# Patient Record
Sex: MALE | Race: WHITE | NOT HISPANIC OR LATINO | Employment: FULL TIME | URBAN - METROPOLITAN AREA
[De-identification: names, ages, dates, MRNs, and addresses within clinical notes are randomized per-mention and may not be internally consistent; named-entity substitution may affect disease eponyms.]

---

## 2024-10-25 ENCOUNTER — OFFICE VISIT (OUTPATIENT)
Dept: UROLOGY | Facility: CLINIC | Age: 69
End: 2024-10-25
Payer: MEDICARE

## 2024-10-25 VITALS
SYSTOLIC BLOOD PRESSURE: 128 MMHG | HEART RATE: 60 BPM | WEIGHT: 234.8 LBS | BODY MASS INDEX: 31.12 KG/M2 | HEIGHT: 73 IN | DIASTOLIC BLOOD PRESSURE: 78 MMHG

## 2024-10-25 DIAGNOSIS — N40.1 BENIGN PROSTATIC HYPERPLASIA WITH LOWER URINARY TRACT SYMPTOMS, SYMPTOM DETAILS UNSPECIFIED: Primary | ICD-10-CM

## 2024-10-25 LAB
BILIRUB BLD-MCNC: NEGATIVE MG/DL
CLARITY, POC: CLEAR
COLOR UR: YELLOW
EXPIRATION DATE: ABNORMAL
GLUCOSE UR STRIP-MCNC: ABNORMAL MG/DL
KETONES UR QL: NEGATIVE
LEUKOCYTE EST, POC: NEGATIVE
Lab: ABNORMAL
NITRITE UR-MCNC: NEGATIVE MG/ML
PH UR: 5.5 [PH] (ref 5–8)
PROT UR STRIP-MCNC: NEGATIVE MG/DL
RBC # UR STRIP: NEGATIVE /UL
SP GR UR: 1.01 (ref 1–1.03)
URINE VOLUME: 470
UROBILINOGEN UR QL: ABNORMAL

## 2024-10-25 RX ORDER — ROSUVASTATIN CALCIUM 5 MG/1
5 TABLET, COATED ORAL
COMMUNITY
Start: 2024-09-10

## 2024-10-25 RX ORDER — TADALAFIL 5 MG/1
5 TABLET ORAL DAILY
Qty: 90 TABLET | Refills: 3 | Status: SHIPPED | OUTPATIENT
Start: 2024-10-25

## 2024-10-25 RX ORDER — LISINOPRIL 2.5 MG/1
1 TABLET ORAL DAILY
COMMUNITY
Start: 2024-09-10

## 2024-10-25 NOTE — PROGRESS NOTES
"Chief Complaint: Benign Prostatic Hypertrophy    Subjective         History of Present Illness  Keven Berger is a 69 y.o. male presents to [facility] to be seen for BPH.    He states a 3 year hx of issues with voiding that have gradually worsened.    He states intermittent stream.     Hesitancy.     Straining to void.    He states he can have urgency as well.     Nocturia x 1-2    Stream is mostly weak.    Some splitting of urinary stream.    He does have an issue keeping erections.     He is still sexually active.     PCP doing PSA testing states this was normal but I do not have any records form PCP.     Did have a brother with prostate cancer.        Objective     Past Medical History:   Diagnosis Date    Diabetes mellitus        History reviewed. No pertinent surgical history.      Current Outpatient Medications:     lisinopril (PRINIVIL,ZESTRIL) 2.5 MG tablet, Take 1 tablet by mouth Daily., Disp: , Rfl:     metFORMIN (GLUCOPHAGE) 500 MG tablet, Take 1 tablet by mouth 2 (Two) Times a Day With Meals., Disp: , Rfl:     rosuvastatin (CRESTOR) 5 MG tablet, Take 1 tablet by mouth every night at bedtime., Disp: , Rfl:     tadalafil (CIALIS) 5 MG tablet, Take 1 tablet by mouth Daily., Disp: 90 tablet, Rfl: 3    No Known Allergies     History reviewed. No pertinent family history.    Social History     Socioeconomic History    Marital status:    Tobacco Use    Smoking status: Never     Passive exposure: Never    Smokeless tobacco: Never   Vaping Use    Vaping status: Never Used       Vital Signs:   /78 (BP Location: Left arm, Patient Position: Sitting, Cuff Size: Large Adult)   Pulse 60   Ht 185.4 cm (73\")   Wt 107 kg (234 lb 12.8 oz)   BMI 30.98 kg/m²      Physical Exam  Genitourinary:           Result Review :   The following data was reviewed by: CARLOS Guardado on 10/25/2024:  Results for orders placed or performed in visit on 10/25/24   POC Urinalysis Dipstick, " Automated    Collection Time: 10/25/24 10:10 AM    Specimen: Urine   Result Value Ref Range    Color Yellow Yellow, Straw, Dark Yellow, Catherine    Clarity, UA Clear Clear    Specific Gravity  1.015 1.005 - 1.030    pH, Urine 5.5 5.0 - 8.0    Leukocytes Negative Negative    Nitrite, UA Negative Negative    Protein, POC Negative Negative mg/dL    Glucose,  mg/dL (A) Negative mg/dL    Ketones, UA Negative Negative    Urobilinogen, UA 0.2 E.U./dL Normal, 0.2 E.U./dL    Bilirubin Negative Negative    Blood, UA Negative Negative    Lot Number [id number]     Expiration Date 7/25    Bladder Scan    Collection Time: 10/25/24 10:46 AM   Result Value Ref Range    Urine Volume 470       Bladder Scan interpretation 10/25/2024    Estimation of residual urine via Digital SignalI 3000 Verathon Bladder Scan  MA/nurse performing: diana Veronica Ma   Residual Urine: 470 ml  Indication: Benign prostatic hyperplasia with lower urinary tract symptoms, symptom details unspecified   Position: Supine  Examination: Incremental scanning of the suprapubic area using 2.0 MHz transducer using copious amounts of acoustic gel.   Findings: An anechoic area was demonstrated which represented the bladder, with measurement of residual urine as noted. I inspected this myself. In that the residual urine was stable or insignificant, refer to plan for treatment and plan necessary at this time.            Procedures        Assessment and Plan    Diagnoses and all orders for this visit:    1. Benign prostatic hyperplasia with lower urinary tract symptoms, symptom details unspecified (Primary)  -     Bladder Scan  -     POC Urinalysis Dipstick, Automated  -     tadalafil (CIALIS) 5 MG tablet; Take 1 tablet by mouth Daily.  Dispense: 90 tablet; Refill: 3          Discussed with the patient at this point in time he is not emptying his bladder well at all.    I did discuss with the patient that his symptoms do sound consistent with BPH with incomplete bladder  emptying.    Given that he is still sexually active and he does not want to have any potential sexual side effects from traditional for blockers prescribed for this we will go ahead and proceed with treating him with tadalafil.    Did encourage patient to double void and timed voiding to ensure he is completely emptying his bladder.    I did discuss with the patient I would like to have catheterized him today to ensure accurate postvoid residual however he did not wish to proceed with this intervention at this time.    We will plan for a follow-up appointment in 8 weeks with a repeat PVR.        I spent 15 minutes caring for Keven on this date of service. This time includes time spent by me in the following activities:reviewing tests, obtaining and/or reviewing a separately obtained history, performing a medically appropriate examination and/or evaluation , counseling and educating the patient/family/caregiver, ordering medications, tests, or procedures, and documenting information in the medical record  Follow Up   Return in about 8 weeks (around 12/17/2024) for 11:30 f/u bph with PVR .  Patient was given instructions and counseling regarding his condition or for health maintenance advice. Please see specific information pulled into the AVS if appropriate.         This document has been electronically signed by CARLOS Guardado  October 25, 2024 10:54 EDT

## 2024-12-17 ENCOUNTER — LAB (OUTPATIENT)
Facility: HOSPITAL | Age: 69
End: 2024-12-17
Payer: MEDICARE

## 2024-12-17 ENCOUNTER — OFFICE VISIT (OUTPATIENT)
Dept: UROLOGY | Age: 69
End: 2024-12-17
Payer: MEDICARE

## 2024-12-17 VITALS
HEIGHT: 73 IN | HEART RATE: 57 BPM | OXYGEN SATURATION: 96 % | WEIGHT: 235 LBS | SYSTOLIC BLOOD PRESSURE: 137 MMHG | DIASTOLIC BLOOD PRESSURE: 84 MMHG | BODY MASS INDEX: 31.14 KG/M2

## 2024-12-17 DIAGNOSIS — R33.9 URINARY RETENTION: ICD-10-CM

## 2024-12-17 DIAGNOSIS — N40.1 BENIGN PROSTATIC HYPERPLASIA WITH LOWER URINARY TRACT SYMPTOMS, SYMPTOM DETAILS UNSPECIFIED: ICD-10-CM

## 2024-12-17 DIAGNOSIS — N40.1 BENIGN PROSTATIC HYPERPLASIA WITH LOWER URINARY TRACT SYMPTOMS, SYMPTOM DETAILS UNSPECIFIED: Primary | ICD-10-CM

## 2024-12-17 LAB
BILIRUB BLD-MCNC: NEGATIVE MG/DL
CLARITY, POC: CLEAR
COLOR UR: YELLOW
GLUCOSE UR STRIP-MCNC: ABNORMAL MG/DL
KETONES UR QL: NEGATIVE
LEUKOCYTE EST, POC: NEGATIVE
NITRITE UR-MCNC: NEGATIVE MG/ML
PH UR: 6 [PH] (ref 5–8)
PROT UR STRIP-MCNC: NEGATIVE MG/DL
PSA SERPL-MCNC: 0.98 NG/ML (ref 0–4)
RBC # UR STRIP: NEGATIVE /UL
SP GR UR: 1.02 (ref 1–1.03)
SPECIMEN VOL SMN: >859 ML
UROBILINOGEN UR QL: ABNORMAL

## 2024-12-17 PROCEDURE — 1159F MED LIST DOCD IN RCRD: CPT | Performed by: NURSE PRACTITIONER

## 2024-12-17 PROCEDURE — 84153 ASSAY OF PSA TOTAL: CPT

## 2024-12-17 PROCEDURE — 1160F RVW MEDS BY RX/DR IN RCRD: CPT | Performed by: NURSE PRACTITIONER

## 2024-12-17 PROCEDURE — 81002 URINALYSIS NONAUTO W/O SCOPE: CPT | Performed by: NURSE PRACTITIONER

## 2024-12-17 PROCEDURE — 99213 OFFICE O/P EST LOW 20 MIN: CPT | Performed by: NURSE PRACTITIONER

## 2024-12-17 PROCEDURE — 51798 US URINE CAPACITY MEASURE: CPT | Performed by: NURSE PRACTITIONER

## 2024-12-17 PROCEDURE — 36415 COLL VENOUS BLD VENIPUNCTURE: CPT

## 2024-12-17 NOTE — PROGRESS NOTES
"Chief Complaint: Benign Prostatic Hypertrophy    Subjective         Benign Prostatic Hypertrophy      Keven Berger is a 69 y.o. male presents to [facility] to be seen for  f/u BPH.    At last visit we started him on tadalafil daily for BPH.     He state he has seen no real change in symptoms.    Still weak flow at times.     Still hard to get started at times.     PVR today is 859        Previous:     He states a 3 year hx of issues with voiding that have gradually worsened.    He states intermittent stream.     Hesitancy.     Straining to void.    He states he can have urgency as well.     Nocturia x 1-2    Stream is mostly weak.    Some splitting of urinary stream.    He does have an issue keeping erections.     He is still sexually active.     PCP doing PSA testing states this was normal but I do not have any records form PCP.     Did have a brother with prostate cancer.        Objective     Past Medical History:   Diagnosis Date    Diabetes mellitus        History reviewed. No pertinent surgical history.      Current Outpatient Medications:     lisinopril (PRINIVIL,ZESTRIL) 2.5 MG tablet, Take 1 tablet by mouth Daily., Disp: , Rfl:     metFORMIN (GLUCOPHAGE) 500 MG tablet, Take 1 tablet by mouth 2 (Two) Times a Day With Meals., Disp: , Rfl:     rosuvastatin (CRESTOR) 5 MG tablet, Take 1 tablet by mouth every night at bedtime., Disp: , Rfl:     tadalafil (CIALIS) 5 MG tablet, Take 1 tablet by mouth Daily., Disp: 90 tablet, Rfl: 3    No Known Allergies     History reviewed. No pertinent family history.    Social History     Socioeconomic History    Marital status:    Tobacco Use    Smoking status: Never     Passive exposure: Never    Smokeless tobacco: Never   Vaping Use    Vaping status: Never Used       Vital Signs:   /84   Pulse 57   Ht 185.4 cm (72.99\")   Wt 107 kg (235 lb)   SpO2 96%   BMI 31.01 kg/m²      Physical Exam     Result Review :   The following data " was reviewed by: CARLOS Guardado on 12/17/2024:  Results for orders placed or performed in visit on 12/17/24   Bladder Scan    Collection Time: 12/17/24 11:45 AM   Result Value Ref Range    Volume: >859    POC Urinalysis Dipstick    Collection Time: 12/17/24 11:51 AM    Specimen: Urine   Result Value Ref Range    Color Yellow Yellow, Straw, Dark Yellow, Catherine    Clarity, UA Clear Clear    Glucose,  mg/dL (A) Negative mg/dL    Bilirubin Negative Negative    Ketones, UA Negative Negative    Specific Gravity  1.020 1.005 - 1.030    Blood, UA Negative Negative    pH, Urine 6.0 5.0 - 8.0    Protein, POC Negative Negative mg/dL    Urobilinogen, UA 0.2 E.U./dL Normal, 0.2 E.U./dL    Leukocytes Negative Negative    Nitrite, UA Negative Negative      Bladder Scan interpretation 12/17/2024    Estimation of residual urine via BVI 3000 Verathon Bladder Scan  MA/nurse performing: annabelle magallon Ma    Residual Urine: 859  ml  Indication: Benign prostatic hyperplasia with lower urinary tract symptoms, symptom details unspecified    Urinary retention   Position: Supine  Examination: Incremental scanning of the suprapubic area using 2.0 MHz transducer using copious amounts of acoustic gel.   Findings: An anechoic area was demonstrated which represented the bladder, with measurement of residual urine as noted. I inspected this myself. In that the residual urine was stable or insignificant, refer to plan for treatment and plan necessary at this time.            Procedures        Assessment and Plan    Diagnoses and all orders for this visit:    1. Benign prostatic hyperplasia with lower urinary tract symptoms, symptom details unspecified (Primary)  -     POC Urinalysis Dipstick  -     Cystoscopy; Future  -     PSA Diagnostic; Future    2. Urinary retention    Other orders  -     Bladder Scan        We will teach him to CIC today.     The patient will require catheterization 3 times a day will require coudé catheter given  anatomy from BPH and enlarged prostate.    We will schedule for a uroflow test.     Will get him set up for a cystoscopy to evaluate lower urinary tract and candidacy for outlet procedure.    He will get Psa today.    IPSS 29 with QOL score at 4      I spent 15 minutes caring for Keven on this date of service. This time includes time spent by me in the following activities:reviewing tests, obtaining and/or reviewing a separately obtained history, performing a medically appropriate examination and/or evaluation , counseling and educating the patient/family/caregiver, ordering medications, tests, or procedures, and documenting information in the medical record  Follow Up   Return for With Dr. Browne for Cystoscopy.  Patient was given instructions and counseling regarding his condition or for health maintenance advice. Please see specific information pulled into the AVS if appropriate.         This document has been electronically signed by CARLOS Guardado  December 17, 2024 13:38 EST

## 2025-02-06 ENCOUNTER — PROCEDURE VISIT (OUTPATIENT)
Dept: UROLOGY | Age: 70
End: 2025-02-06
Payer: MEDICARE

## 2025-02-06 VITALS — HEIGHT: 73 IN | RESPIRATION RATE: 18 BRPM | WEIGHT: 235 LBS | BODY MASS INDEX: 31.14 KG/M2

## 2025-02-06 DIAGNOSIS — R33.9 URINARY RETENTION: ICD-10-CM

## 2025-02-06 DIAGNOSIS — N13.8 BENIGN PROSTATIC HYPERPLASIA WITH URINARY OBSTRUCTION: Primary | ICD-10-CM

## 2025-02-06 DIAGNOSIS — N40.1 BENIGN PROSTATIC HYPERPLASIA WITH URINARY OBSTRUCTION: Primary | ICD-10-CM

## 2025-02-06 NOTE — PROGRESS NOTES
Preprocedure diagnosis  BPH with outlet obstruction; urinary retention    Postprocedure diagnosis  Same as above    Procedure  Flexible Cystourethroscopy    Attending surgeon  Rhianna Browne MD    Anesthesia  2% lidocaine jelly intraurethrally    Complications  None    Indications  69 y.o. male undergoing a flexible cystoscopy for the above mentioned indications.  Previously seen by urology Ngoc ARROYO who presents for lower urinary tract evaluation.  Instructed CIC last visit.  Performing CIC 3 times a day.  Has chronic Hernandez catheters.  Denies issues performing catheterization.  Patient currently sexually active with some issues maintaining.  On daily Cialis.    Informed consent was obtained.      PSA 12/17/2024: 0.92    Findings  Patent urethra with severe prostatomegaly, bilateral coapting lateral lobes, very large median lobe with intravesical prostate tissue and varices.   Large capacity bladder, prominent blood vessels from intravesical prostate tissue at base of bladder; cystoscopy revealed one right and left ureteral orifice in the normal anatomic position, normal bladder mucosa and no tumors, masses or stones.  Moderate trabeculations, several diverticula.    Procedure  The patient was placed in supine position and prepped and draped in sterile fashion with lidocaine jelly per urethra for anesthesia.  A timeout was performed.  The 14F flexible cystoscope was lubricated and gently placed through the urethra and into the bladder.  The bladder was completely visualized.  The cystoscope was retroflexed and the bladder neck visualized. Findings were as above. The scope was withdrawn and the procedure terminated.  The patient tolerated the procedure well.        Plan:  Tolerated procedure well.  Instructions provided.  Cystoscopic findings discussed with patient include severe prostatomegaly with very large median lobe, large amount of intravesical prostate tissue; almost certainly etiology for his  outlet obstruction, urinary retention; patient with moderate trabeculations and several diverticula, no tumors or lesions.  Believe he would likely benefit from outlet obstruction; do not believe UDS indicated given today's findings.    Patient sexually active, this is a consideration regarding type of outlet procedure.  Op ablation previously discussed with him given reduced sexual side effects.  He would like to consider this for treatment.    Will warrant additional workup per insurance including TRUS prostate measurement and uroflow as well as additional confirmation for approval.  He notes understanding of this.    Continue current medication regimen; routine CIC and    Schedule TRUS measurement uroflow; assessment of if eligible for aqua ablation  All questions addressed

## 2025-04-02 ENCOUNTER — TELEPHONE (OUTPATIENT)
Dept: UROLOGY | Age: 70
End: 2025-04-02
Payer: MEDICARE

## 2025-04-02 NOTE — TELEPHONE ENCOUNTER
OFFERED WIFE TO HAVE PT COME IN FOR A PVR. SHE SAID THAT HE EVENTUALLY WAS ABLE TO GO LAST NIGHT AND FELT BETTER. ADVISED HER THAT HE IS AT THE TOP OF OUR LIST TO SCHEDULE FOR THOSE 2 TESTS ONCE WE'RE ABLE TO DO THEM IN OFFICE. ADVISED THAT ANYTIME HE IS NOT ABLE TO EMPTY, THEY CAN CALL US TO GET HIM IN FOR A PVR. WIFE EXPRESSED UNDERSTANDING AND IS AGREEABLE.

## 2025-04-02 NOTE — TELEPHONE ENCOUNTER
PATIENT'S WIFE CALLED.  SHE SAID DR. JOHNSON TOLD HER  HE HAD THE LARGEST PROSTATE SHE HAS SEEN.      HE HAS BEEN HAVING ISSUES URINATING FOR A WHILE, AND SHE RECOMMENDED THAT HE CATH HIMSELF, WHICH HE HAS BEEN DOING.  HE HAS HAD MORE DIFFICULTY URINATING LATELY.  HE TRIED TO CATH YESTERDAY, AND COULD NOT GET ANY URINE.  HE FINALLY DID URINATE ON HIS OWN.  SHE SAID THERE WAS BLOOD, AND SHE THINKS IT IS FROM WHERE HE TRAUMATIZED THE URETHRA.  HE URINATED LAST NIGHT AND THIS MORNING.  HE IS AT WORK, SO SHE IS UNSURE HOW IS HAS URINATED TODAY.    SHE SAID THE LAST TIME HE WAS IN THE OFFICE HE STILL  - 400 RESIDUAL AFTER PEEING.    SHE SAID DR. JOHNSON MENTIONED HYDRO-ABLATION, BUT SAID THERE WAS A TEST HE WOULD HAVE TO DO FIRST, WHERE HE WOULD HAVE TO PEE IN A BUCKET.    WIFE WANTS TO SCHEDULE AN APPOINTMENT WITH DR. JOHNSON FOR HIM, AND WANTS TO GET THE TEST SET UP.    I MADE HER AWARE DR. JOHNSON IS OUT OF THE OFFICE.    #[phone]

## 2025-04-29 ENCOUNTER — PREP FOR SURGERY (OUTPATIENT)
Dept: OTHER | Facility: HOSPITAL | Age: 70
End: 2025-04-29
Payer: MEDICARE

## 2025-04-29 DIAGNOSIS — N13.8 BENIGN PROSTATIC HYPERPLASIA WITH URINARY OBSTRUCTION: Primary | ICD-10-CM

## 2025-04-29 DIAGNOSIS — R33.9 URINARY RETENTION: ICD-10-CM

## 2025-04-29 DIAGNOSIS — N40.1 BENIGN PROSTATIC HYPERPLASIA WITH URINARY OBSTRUCTION: Primary | ICD-10-CM

## 2025-04-29 RX ORDER — LEVOFLOXACIN 5 MG/ML
500 INJECTION, SOLUTION INTRAVENOUS ONCE
OUTPATIENT
Start: 2025-04-29 | End: 2025-04-29

## 2025-04-29 RX ORDER — SODIUM CHLORIDE 0.9 % (FLUSH) 0.9 %
3 SYRINGE (ML) INJECTION EVERY 12 HOURS SCHEDULED
OUTPATIENT
Start: 2025-04-29

## 2025-04-29 RX ORDER — SODIUM CHLORIDE 9 MG/ML
40 INJECTION, SOLUTION INTRAVENOUS AS NEEDED
OUTPATIENT
Start: 2025-04-29

## 2025-04-29 RX ORDER — SODIUM CHLORIDE 0.9 % (FLUSH) 0.9 %
10 SYRINGE (ML) INJECTION AS NEEDED
OUTPATIENT
Start: 2025-04-29

## 2025-05-01 RX ORDER — METFORMIN HYDROCHLORIDE 500 MG/1
2 TABLET, EXTENDED RELEASE ORAL EVERY 12 HOURS SCHEDULED
COMMUNITY
Start: 2025-02-18

## 2025-05-07 ENCOUNTER — PROCEDURE VISIT (OUTPATIENT)
Dept: UROLOGY | Age: 70
End: 2025-05-07
Payer: MEDICARE

## 2025-05-07 DIAGNOSIS — N13.8 BENIGN PROSTATIC HYPERPLASIA WITH URINARY OBSTRUCTION: Primary | ICD-10-CM

## 2025-05-07 DIAGNOSIS — N40.1 BENIGN PROSTATIC HYPERPLASIA WITH URINARY OBSTRUCTION: Primary | ICD-10-CM

## 2025-05-07 DIAGNOSIS — R33.9 URINARY RETENTION: ICD-10-CM

## 2025-05-07 NOTE — PROGRESS NOTES
Transrectal ultrasound prostate measurement  05/07/2025    Performed by: Rhianna Browne MD    Patient was brought to the procedure room and consent was obtained.  He was in the left lateral decubitus position. A transrectal ultrasound probe was then lubricated, covered with a condom, and placed into the rectum. The ultrasound machine at 7.5 MHz was used to perform the ultrasound exam. The prostate was scanned in both transverse and longitudinal fashion. Multiple images were obtained.   The prostate height is 4.77 mm.   The prostate width is 2.66 mm.   The prostate length is 3.97 mm.   The total prostate volume is 33.08 gms.   The appearance of the prostate is normal.   The transrectal probe was then removed.  Patient tolerated procedure well.    ______  Patient attempted to perform uroflow several times during encounter.  Despite drinking ample fluids, patient unable to spontaneously void.    _____  Per insurance approval, not eligible for aqua ablation procedure unless meets parameters via uroflow.  Patient notes understanding this.    Patient remains uncertain whether he would wish to pursue outlet procedure.  Risk, benefits, and alternatives of an outlet procedure particularly aqua ablation was discussed with patient and wife at length.  Patient wishes to continue to consider and plans to try uroflow again.    Recommend continued follow-up with urology Ngoc ARROYO; patient continue cathing per routine.    Could schedule outlet procedure; recommend transurethral resection of prostate if remains in eligible for aqua ablation at any time.    Will arrange follow-up with Ngoc    All questions addressed at this time    Electronically signed by Rhianna Browne MD, 05/07/25, 3:04 PM EDT.

## 2025-05-12 ENCOUNTER — TELEPHONE (OUTPATIENT)
Dept: UROLOGY | Age: 70
End: 2025-05-12
Payer: MEDICARE

## 2025-05-12 NOTE — TELEPHONE ENCOUNTER
Call made to pt and spouse regarding plan of care; RN informed pt that since he was unable to complete the uroflow per insurance guidelines he is not eligible for the aquablation; RN states that they will follow-up with mookie until he is ready to treat his prostate surgically then we will be more than happy to do his surgery; pt and spouse are agreeable and understanding with no further questions at this time.

## 2025-08-29 ENCOUNTER — TELEPHONE (OUTPATIENT)
Dept: UROLOGY | Age: 70
End: 2025-08-29
Payer: MEDICARE